# Patient Record
Sex: MALE | Race: WHITE
[De-identification: names, ages, dates, MRNs, and addresses within clinical notes are randomized per-mention and may not be internally consistent; named-entity substitution may affect disease eponyms.]

---

## 2019-02-03 ENCOUNTER — HOSPITAL ENCOUNTER (INPATIENT)
Dept: HOSPITAL 97 - ER | Age: 84
LOS: 1 days | DRG: 948 | End: 2019-02-04
Attending: FAMILY MEDICINE | Admitting: HOSPITALIST
Payer: COMMERCIAL

## 2019-02-03 VITALS — BODY MASS INDEX: 29.3 KG/M2

## 2019-02-03 DIAGNOSIS — C44.41: ICD-10-CM

## 2019-02-03 DIAGNOSIS — I25.10: ICD-10-CM

## 2019-02-03 DIAGNOSIS — F02.80: ICD-10-CM

## 2019-02-03 DIAGNOSIS — I48.91: ICD-10-CM

## 2019-02-03 DIAGNOSIS — E78.5: ICD-10-CM

## 2019-02-03 DIAGNOSIS — Z88.0: ICD-10-CM

## 2019-02-03 DIAGNOSIS — I44.39: ICD-10-CM

## 2019-02-03 DIAGNOSIS — I46.9: ICD-10-CM

## 2019-02-03 DIAGNOSIS — R41.82: Primary | ICD-10-CM

## 2019-02-03 DIAGNOSIS — F32.9: ICD-10-CM

## 2019-02-03 DIAGNOSIS — I10: ICD-10-CM

## 2019-02-03 DIAGNOSIS — Z95.5: ICD-10-CM

## 2019-02-03 DIAGNOSIS — I48.92: ICD-10-CM

## 2019-02-03 DIAGNOSIS — R47.81: ICD-10-CM

## 2019-02-03 DIAGNOSIS — Z66: ICD-10-CM

## 2019-02-03 DIAGNOSIS — G30.8: ICD-10-CM

## 2019-02-03 DIAGNOSIS — Z87.891: ICD-10-CM

## 2019-02-03 LAB
ALBUMIN SERPL BCP-MCNC: 3.3 G/DL (ref 3.4–5)
ALP SERPL-CCNC: 90 U/L (ref 45–117)
ALT SERPL W P-5'-P-CCNC: 20 U/L (ref 12–78)
AST SERPL W P-5'-P-CCNC: 15 U/L (ref 15–37)
BUN BLD-MCNC: 21 MG/DL (ref 7–18)
CKMB CREATINE KINASE MB: < 1 NG/ML (ref 0.3–3.6)
GLUCOSE SERPLBLD-MCNC: 135 MG/DL (ref 74–106)
HCT VFR BLD CALC: 35.8 % (ref 39.6–49)
INR BLD: 1.18
LIPASE SERPL-CCNC: 129 U/L (ref 73–393)
LYMPHOCYTES # SPEC AUTO: 1.1 K/UL (ref 0.7–4.9)
PMV BLD: 9.3 FL (ref 7.6–11.3)
POTASSIUM SERPL-SCNC: 4 MMOL/L (ref 3.5–5.1)
RBC # BLD: 4.1 M/UL (ref 4.33–5.43)
TROPONIN (EMERG DEPT USE ONLY): < 0.02 NG/ML (ref 0–0.04)
UA COMPLETE W REFLEX CULTURE PNL UR: (no result)

## 2019-02-03 PROCEDURE — 84145 PROCALCITONIN (PCT): CPT

## 2019-02-03 PROCEDURE — 83735 ASSAY OF MAGNESIUM: CPT

## 2019-02-03 PROCEDURE — 92610 EVALUATE SWALLOWING FUNCTION: CPT

## 2019-02-03 PROCEDURE — 84100 ASSAY OF PHOSPHORUS: CPT

## 2019-02-03 PROCEDURE — 83605 ASSAY OF LACTIC ACID: CPT

## 2019-02-03 PROCEDURE — 93306 TTE W/DOPPLER COMPLETE: CPT

## 2019-02-03 PROCEDURE — 93005 ELECTROCARDIOGRAM TRACING: CPT

## 2019-02-03 PROCEDURE — 82553 CREATINE MB FRACTION: CPT

## 2019-02-03 PROCEDURE — 71045 X-RAY EXAM CHEST 1 VIEW: CPT

## 2019-02-03 PROCEDURE — 80053 COMPREHEN METABOLIC PANEL: CPT

## 2019-02-03 PROCEDURE — 85025 COMPLETE CBC W/AUTO DIFF WBC: CPT

## 2019-02-03 PROCEDURE — 87040 BLOOD CULTURE FOR BACTERIA: CPT

## 2019-02-03 PROCEDURE — 85610 PROTHROMBIN TIME: CPT

## 2019-02-03 PROCEDURE — 87086 URINE CULTURE/COLONY COUNT: CPT

## 2019-02-03 PROCEDURE — 82550 ASSAY OF CK (CPK): CPT

## 2019-02-03 PROCEDURE — 80076 HEPATIC FUNCTION PANEL: CPT

## 2019-02-03 PROCEDURE — 83690 ASSAY OF LIPASE: CPT

## 2019-02-03 PROCEDURE — 85730 THROMBOPLASTIN TIME PARTIAL: CPT

## 2019-02-03 PROCEDURE — 81003 URINALYSIS AUTO W/O SCOPE: CPT

## 2019-02-03 PROCEDURE — 36415 COLL VENOUS BLD VENIPUNCTURE: CPT

## 2019-02-03 PROCEDURE — 82962 GLUCOSE BLOOD TEST: CPT

## 2019-02-03 PROCEDURE — 84484 ASSAY OF TROPONIN QUANT: CPT

## 2019-02-03 PROCEDURE — 80061 LIPID PANEL: CPT

## 2019-02-03 PROCEDURE — 70450 CT HEAD/BRAIN W/O DYE: CPT

## 2019-02-03 PROCEDURE — 87088 URINE BACTERIA CULTURE: CPT

## 2019-02-03 PROCEDURE — 51702 INSERT TEMP BLADDER CATH: CPT

## 2019-02-03 PROCEDURE — 99285 EMERGENCY DEPT VISIT HI MDM: CPT

## 2019-02-03 PROCEDURE — 80048 BASIC METABOLIC PNL TOTAL CA: CPT

## 2019-02-03 PROCEDURE — 81015 MICROSCOPIC EXAM OF URINE: CPT

## 2019-02-03 NOTE — EDPHYS
Physician Documentation                                                                           

 [facility]                                                                

Name: Jose F Steele                                                                             

Age: 88 yrs                                                                                       

Sex: Male                                                                                         

: 1930                                                                                   

MRN: [id number]                                                                                   

Arrival Date: 2019                                                                          

Time: 18:43                                                                                       

Account#: [id number]                                                                            

Bed 3                                                                                             

Private MD:                                                                                       

ED Physician Ronan Escobedo                                                                      

HPI:                                                                                              

                                                                                             

19:20 This 88 yrs old  Male presents to ER via EMS with complaints of Altered Mental jr8 

      Status.                                                                                     

19:20 The patient presents with confusion, decreased mental status, decreased responsiveness. jr8 

      Onset: The symptoms/episode began/occurred acutely, today, at 14:30. Possible causes:       

      unknown. Associated signs and symptoms: The patient has no apparent associated signs or     

      symptoms. Current symptoms: In the emergency department the patient's symptoms are          

      unchanged from the initial presentation. Patient's baseline: Neuro: alert but confused,     

      Motor: no deficits, Ambulation: walks without assistance, Speech: normal. The patient       

      has not experienced similar symptoms in the past. The patient has not recently seen a       

      physician.                                                                                  

                                                                                                  

Historical:                                                                                       

- Allergies:                                                                                      

19:12 PENICILLINS;                                                                            jl7 

- Home Meds:                                                                                      

19:12 sertraline 50 mg oral tab [Active]; lorazepam 1 mg Oral tab 1 tab PRN [Active];         jl7 

      memantine 28 mg Oral tab 1 cap once daily [Active]; aspirin 325 mg Oral tab 1 tab once      

      daily [Active]; donepezil 10 mg Oral tab 1 tab once daily [Active]; fosinopril 20 mg        

      Oral tab 1 tab once daily [Active]; simvastatin 20 mg Oral tab 1 tab once daily             

      [Active]; donepezil 10 mg oral tab 1 tab once daily [Active]; Triamcinolone Acetonide       

      Cabell [Active];                                                                              

- PMHx:                                                                                           

19:12 Alzheimers; Dementia; Hyperlipidemia; Hypertension;                                     jl7 

19:14 basal cell carcinoma; Depression;                                                       jl7 

- PSHx:                                                                                           

19:14 Heart stents;                                                                           jl7 

                                                                                                  

- Immunization history:: Adult Immunizations up to date.                                          

- Social history:: Smoking status: Patient/guardian denies using tobacco.                         

- Ebola Screening: : No symptoms or risks identified at this time.                                

                                                                                                  

                                                                                                  

ROS:                                                                                              

19:20 Unable to obtain ROS due to altered mental status.                                      jr8 

                                                                                                  

Exam:                                                                                             

19:18 Eyes:  Pupils equal round and reactive to light, extra-ocular motions intact.  Lids and jr8 

      lashes normal.  Conjunctiva and sclera are non-icteric and not injected.  Cornea within     

      normal limits.  Periorbital areas with no swelling, redness, or edema. ENT:  Nares          

      patent. No nasal discharge, no septal abnormalities noted.  Tympanic membranes are          

      normal and external auditory canals are clear.  Oropharynx with no redness, swelling,       

      or masses, exudates, or evidence of obstruction, uvula midline.  Mucous membranes           

      moist. Neck:  Trachea midline, no thyromegaly or masses palpated, and no cervical           

      lymphadenopathy.  Supple, full range of motion without nuchal rigidity, or vertebral        

      point tenderness.  No Meningismus. Cardiovascular:  Regular rate and rhythm with a          

      normal S1 and S2.  No gallops, murmurs, or rubs.  Normal PMI, no JVD.  No pulse             

      deficits. Respiratory:  Lungs have equal breath sounds bilaterally, clear to                

      auscultation and percussion.  No rales, rhonchi or wheezes noted.  No increased work of     

      breathing, no retractions or nasal flaring. Abdomen/GI:  Soft, non-tender, with normal      

      bowel sounds.  No distension or tympany.  No guarding or rebound.  No evidence of           

      tenderness throughout. Back:  No spinal tenderness.  No costovertebral tenderness.          

      Full range of motion. Skin:  Warm, dry with normal turgor.  Normal color with no            

      rashes, no lesions, and no evidence of cellulitis. MS/ Extremity:  Pulses equal, no         

      cyanosis.  Neurovascular intact.  Full, normal range of motion.                             

19:18 Neuro: Orientation: to person, Mentation: able to follow commands, slow to respond,         

      confused, Memory: immediate memory  is impaired, remote memory is impaired, recent          

      memory  is impaired, Cranial nerves: extraocular movements are intact, Speech is            

      dysarthric, slowed, slurred, Tongue strength is normal, Motor: moves all fours,             

      Sensation: no obvious gross deficits, seizure activity, is not displayed by the             

      patient, Abnormal movements: there are no abnormal movements.                               

                                                                                                  

Vital Signs:                                                                                      

18:43  / 69; Pulse 76; Resp 16 S; Temp 99.9(O); Pulse Ox 100% on R/A;                   jl7 

19:15  / 62; Pulse 77; Resp 18 S; Pulse Ox 100% on R/A;                                 jl7 

20:30  / 66; Pulse 76; Resp 16; Pulse Ox 100% ;                                         rr5 

21:12  / 76; Pulse 92; Resp 18; Pulse Ox 95% on R/A;                                    ea  

21:30  / 84; Pulse 80; Resp 18; Pulse Ox 96% on R/A;                                    ea  

                                                                                                  

NIH Stroke Scale Scores:                                                                          

18:43 NIHSS Score: 11                                                                         jl7 

19:18 NIHSS Score: 11                                                                         jr8 

                                                                                                  

MDM:                                                                                              

18:44 Patient medically screened.                                                             [facility] 

20:31 Data reviewed: vital signs, nurses notes, lab test result(s), EKG, radiologic studies,  [facility] 

      CT scan, plain films. Data interpreted: Pulse oximetry: on room air is 100 %.               

      Interpretation: normal. Counseling: I had a detailed discussion with the patient and/or     

      guardian regarding: the historical points, exam findings, and any diagnostic results        

      supporting the discharge/admit diagnosis, lab results, radiology results, the need for      

      further work-up and treatment in the hospital. Physician consultation: Pratima Ford MD     

      was called at 20:31, was contacted at 20:31, regarding admission, to the telemetry          

      unit. consult, patient's condition, and will see patient.                                   

                                                                                                  

                                                                                             

18:45 Order name: Urine Culture                                                               [facility] 

                                                                                             

18:45 Order name: Basic Metabolic Panel                                                       [facility] 

                                                                                             

18:45 Order name: Blood Culture Adult (2)                                                     [facility] 

                                                                                             

18:45 Order name: CBC with Diff                                                               [facility] 

                                                                                             

18:45 Order name: Ckmb                                                                        [facility] 

                                                                                             

18:45 Order name: CPK                                                                         [facility] 

                                                                                             

18:45 Order name: Lactate                                                                     [facility] 

                                                                                             

18:45 Order name: LFT's; Complete Time: 20:14                                                 [facility] 

                                                                                             

18:45 Order name: Lipase; Complete Time: 20:14                                                [facility] 

                                                                                             

18:45 Order name: Procalcitonin; Complete Time: 20:26                                         [facility] 

                                                                                             

18:45 Order name: Protime (+inr); Complete Time: 20:08                                        [facility] 

                                                                                             

18:45 Order name: Ptt, Activated; Complete Time: 20:08                                        [facility] 

                                                                                             

18:45 Order name: Troponin (emerg Dept Use Only); Complete Time: 20:14                        [facility] 

                                                                                             

18:45 Order name: Urine Microscopic Only; Complete Time: 20:26                                [facility] 

                                                                                             

18:46 Order name: Urine Culture                                                               [facility]

                                                                                             

18:46 Order name: Basic Metabolic Panel; Complete Time: 20:14                                 [facility]

                                                                                             

18:46 Order name: Blood Culture                                                               [facility]

                                                                                             

18:46 Order name: CBC with Automated Diff; Complete Time: 19:57                               [facility]

                                                                                             

18:46 Order name: CKMB Creatine Kinase MB; Complete Time: 20:14                               [facility]

                                                                                             

18:46 Order name: Creatine Phosphokinase; Complete Time: 20:14                                [facility]

                                                                                             

18:46 Order name: Lactate; Complete Time: 20:08                                               [facility]

                                                                                             

19:53 Order name: Urine Dipstick--Ancillary (enter results); Complete Time: 20:26             2 

                                                                                             

20:49 Order name: CBC with Automated Diff                                                     [facility]

                                                                                             

20:49 Order name: CBC with Automated Diff                                                     [facility]

                                                                                             

20:49 Order name: Comprehensive Metabolic Panel                                               [facility]

                                                                                             

20:49 Order name: Comprehensive Metabolic Panel                                               [facility]

                                                                                             

20:49 Order name: Lipid Profile                                                               [facility]

                                                                                             

20:49 Order name: Lipid Profile                                                               [facility]

                                                                                             

20:49 Order name: Magnesium                                                                   [facility]

                                                                                             

20:49 Order name: Magnesium                                                                   [facility]

                                                                                             

18:45 Order name: Cath; Complete Time: 19:50                                                  8 

                                                                                             

18:45 Order name: Chest Single View XRAY; Complete Time: 20:02                                8 

                                                                                             

18:45 Order name: Accucheck; Complete Time: 19:49                                             8 

                                                                                             

18:45 Order name: Cardiac monitoring; Complete Time: 19:30                                    8 

                                                                                             

18:45 Order name: EKG - Nurse/Tech; Complete Time: 19:49                                      8 

                                                                                             

18:45 Order name: IV Saline Lock - Large Bore; Complete Time: 19:50                           8 

                                                                                             

18:45 Order name: Labs collected and sent; Complete Time: 19:50                               8 

                                                                                             

18:45 Order name: O2 Per Protocol; Complete Time: 19:30                                       8 

                                                                                             

18:45 Order name: O2 Sat Monitoring; Complete Time: 19:30                                     8 

                                                                                             

18:45 Order name: Urine Dipstick-Ancillary (obtain specimen); Complete Time: 19:49            8 

                                                                                             

18:45 Order name: CT Head Brain wo Cont; Complete Time: 19:21                                 8 

                                                                                             

20:47 Order name: NPO                                                                         [facility]

                                                                                             

20:47 Order name: NPO                                                                         [facility]

                                                                                             

20:48 Order name: Physical Therapy Consult                                                    [facility]

                                                                                             

20:48 Order name: Speech Therapy Consult                                                      [facility]

                                                                                             

20:48 Order name: NPO                                                                         [facility]

                                                                                             

20:48 Order name: Echo with Doppler                                                           [facility]

                                                                                             

20:49 Order name: EKG Electrocardiogram                                                       [facility]

                                                                                             

20:49 Order name: Phosphorus                                                                  [facility]

                                                                                             

20:49 Order name: Phosphorus                                                                  [facility]

                                                                                             

20:49 Order name: Stroke Protocol                                                             [facility]

                                                                                                  

Administered Medications:                                                                         

No medications were administered                                                                  

                                                                                                  

                                                                                                  

Point of Care Testing:                                                                            

      Blood Glucose:                                                                              

19:30 Blood Glucose: 154 mg/dL;                                                               rr5 

      Ranges:                                                                                     

      Critical Glucose Levels:Adult <50 mg/dl or >400 mg/dl  <40 mg/dl or >180 mg/dl       

Disposition:                                                                                      

                                                                                             

09:21 Co-signature as Attending Physician, Ronan Escobedo MD I agree with the assessment and  rich 

      plan of care.                                                                               

                                                                                                  

Disposition:                                                                                      

19 20:31 Hospitalization ordered by Pratima Ford for Inpatient Admission. Preliminary     

  diagnosis is Altered mental status, unspecified.                                                

- Bed requested for Telemetry/MedSurg (Inpatient).                                                

- Status is Inpatient Admission.                                                              ea  

- Condition is Fair.                                                                              

- Problem is new.                                                                                 

- Symptoms are unchanged.                                                                         

UTI on Admission? No                                                                              

                                                                                                  

                                                                                                  

                                                                                                  

                                                                                                  

NIH Stroke Scale - NIH Stroke Score                                                               

Date: 2019                                                                                  

Time: 18:43                                                                                       

Total Score = 11                                                                                  

  1a. Level of Consciousness (LOC) - 0(Alert)                                                     

  1b. Level of Consciousness (LOC) (Year \T\ Age) - 2(Neither)                                    

  1c. LOC Commands (Open \T\ Closes Eyes/) - 0(Both)                                          

   2. Best Gaze (Lateral Gaze Paresis) - 0(Normal)                                                

   3. Visual Field Loss - 0(No visual loss)                                                       

   4. Facial Palsy - 0(Normal)                                                                    

  5a. Left Arm: Motor (10-second hold) - 1(Drift)                                                 

  5b. Right Arm: Motor (10-second hold) - 1(Drift)                                                

  6a. Left Leg: Motor (5-second hold - always test supine) - 1(Drift)                             

  6b. Right Leg: Motor (5-second hold - always test supine) - 1(Drift)                            

   7. Limb Ataxia (finger/nose \T\ heel/shin - test with eyes open) - 2(Present in two            

      limbs)                                                                                      

   8. Sensory Loss (pinprick arms/legs/face) - 0(Normal)                                          

   9. Best Language: Aphasia (description/naming/reading) - 2(Severe aphasia)                     

  10. Dysarthria (speech clarity - read or repeat words) - 1(Mild to Moderate)                    

  11. Extinction and Inattention (visual/tactile/auditory/spatial/personal) - 0(No                

      abnormality)                                                                                

Initials: jl7                                                                                     

                                                                                                  

                                                                                                  

NIH Stroke Scale - NIH Stroke Score                                                               

Date: 2019                                                                                  

Time: 19:18                                                                                       

Total Score = 11                                                                                  

  1a. Level of Consciousness (LOC) - 0(Alert)                                                     

  1b. Level of Consciousness (LOC) (Year \T\ Age) - 2(Neither)                                    

  1c. LOC Commands (Open \T\ Closes Eyes/) - 0(Both)                                          

   2. Best Gaze (Lateral Gaze Paresis) - 0(Normal)                                                

   3. Visual Field Loss - 0(No visual loss)                                                       

   4. Facial Palsy - 0(Normal)                                                                    

  5a. Left Arm: Motor (10-second hold) - 1(Drift)                                                 

  5b. Right Arm: Motor (10-second hold) - 1(Drift)                                                

  6a. Left Leg: Motor (5-second hold - always test supine) - 1(Drift)                             

  6b. Right Leg: Motor (5-second hold - always test supine) - 1(Drift)                            

   7. Limb Ataxia (finger/nose \T\ heel/shin - test with eyes open) - 2(Present in two            

      limbs)                                                                                      

   8. Sensory Loss (pinprick arms/legs/face) - 0(Normal)                                          

   9. Best Language: Aphasia (description/naming/reading) - 2(Severe aphasia)                     

  10. Dysarthria (speech clarity - read or repeat words) - 1(Mild to Moderate)                    

  11. Extinction and Inattention (visual/tactile/auditory/spatial/personal) - 0(No                

      abnormality)                                                                                

Initials: jr8                                                                                     

                                                                                                  

Signatures:                                                                                       

Dispatcher [employer]                           EDMS                                                 

Marcella Gutierrez RN RN mw Anderson, Corey, MD MD cha Roszak, Josh, PA PA jr8                                                  

Alvino Espinal RN RN   jl7                                                  

Sasha Ramirez RN RN ea                                                   

                                                                                                  

Corrections: (The following items were deleted from the chart)                                    

                                                                                             

20:52 20:31 Hospitalization Ordered by Pratima Ford MD for Inpatient Admission.      jeremias          

      Preliminary diagnosis is Altered mental status, unspecified. Bed requested for              

      Telemetry/MedSurg (Inpatient). Status is Inpatient Admission. Condition is                  

      Fair. Problem is new. Symptoms are unchanged. UTI on Admission? No. jr8                     

20:53 20:49 Chest Pa And Lat (2 Views) ordered. [facility]                                  EDMS        

21:45 20:52 2019 20:31 Hospitalization Ordered by Pratima Ford MD for          ea          

      Inpatient Admission. Preliminary diagnosis is Altered mental status,                        

      unspecified. Bed requested for Telemetry/MedSurg (Inpatient). Status is                     

      Inpatient Admission. Condition is Fair. Problem is new. Symptoms are unchanged.             

      UTI on Admission? No. mw                                                                    

                                                                                                  

**************************************************************************************************

## 2019-02-03 NOTE — RAD REPORT
EXAM DESCRIPTION:  RAD - Chest Single View - 2/3/2019 7:08 pm

 

CLINICAL HISTORY:  FEVER

Chest pain.

 

COMPARISON:  Chest Single View dated 9/23/2018

 

FINDINGS:  Portable technique limits examination quality.

 

Linear opacities are present left retrocardiac region which may represent atelectasis or early pneumo
timothy. The lungs are otherwise clear. The heart is normal in size. No displaced fractures.

## 2019-02-03 NOTE — RAD REPORT
EXAM DESCRIPTION:  CT - Head Brain Wo Cont - 2/3/2019 7:00 pm

 

CLINICAL HISTORY:  Confused;Declining state

Headache, drowsiness

 

COMPARISON:  Head C Spine Mpr Wo Con dated 9/23/2018

 

TECHNIQUE:  All CT scans are performed using dose optimization technique as appropriate and may inclu
de automated exposure control or mA/KV adjustment according to patient size.

 

FINDINGS:  No intracranial hemorrhage, hydrocephalus or extra-axial fluid collection.Mild generalized
 brain atrophy is present with mild periventricular and deep white matter chronic microvascular ische
tao changes.No areas of brain edema or evidence of midline shift.

 

Fluid is present in the left maxillary antrum, ethmoid air cells and left frontal sinus compatible wi
th multifocal sinusitis. The calvarium is intact.

 

IMPRESSION:  No acute intracranial abnormality.

 

Multifocal sinusitis.

## 2019-02-03 NOTE — ER
Nurse's Notes                                                                                     

 [facility]                                                                

Name: Jose F Steele                                                                             

Age: 88 yrs                                                                                       

Sex: Male                                                                                         

: 1930                                                                                   

MRN: [id number]                                                                                   

Arrival Date: 2019                                                                          

Time: 18:43                                                                                       

Account#: [id number]                                                                            

Bed 3                                                                                             

Private MD:                                                                                       

Diagnosis: Altered mental status, unspecified                                                     

                                                                                                  

Presentation:                                                                                     

                                                                                             

18:44 Presenting complaint: EMS states: Family reports he is altered, last known normal 1430, jl7 

      difficulty speaking. Transition of care: patient was not received from another setting      

      of care. Onset of symptoms was 2019 at 14:30. Risk Assessment: Do you want     

      to hurt yourself or someone else? Patient reports no desire to harm self or others.         

      Initial Sepsis Screen: Does the patient meet any 2 criteria? Altered Mental Status. No.     

      Patient's initial sepsis screen is negative. Does the patient have a suspected source       

      of infection? Yes: Productive cough/pneumonia. Care prior to arrival: None. Glucose         

      check: 146.                                                                                 

18:44 Method Of Arrival: EMS: [facility]                                                7 

18:44 Acuity: COREY 2                                                                           jl7 

19:00 An acute neurological deficit is present. Pre-hospital glucose is not applicable to     ea  

      this patient.                                                                               

                                                                                                  

Triage Assessment:                                                                                

18:43 General: Appears in no apparent distress. uncomfortable, Behavior is calm, cooperative. jl7 

      Pain: Unable to use pain scale. Patient is disoriented. Does not appear to understand       

      pain scale. EENT: No signs and/or symptoms were reported regarding the EENT system.         

      Neuro: Level of Consciousness is awake, alert, obeys commands, Oriented to person.          

      Cardiovascular: Patient's skin is warm and dry. Respiratory: Airway is patent               

      Respiratory effort is even, unlabored, Respiratory pattern is regular, symmetrical.         

      Derm: Skin is pink, warm \T\ dry.                                                           

19:10 The onset of the patients symptoms was 2019 at 14:30.                      ea  

                                                                                                  

Stroke Activation: Symptom onset < 3 hours                                                        

 Physician: Stroke Attending; Name: ; Notified At: ; Arrived At:                                  

 Physician: Chief Stroke Resident; Name: ; Notified At: ; Arrived At:                             

 Physician: Stroke Resident; Name: ; Notified At: ; Arrived At:                                   

 Physician: ED Attending; Name: ; Notified At: ; Arrived At:                                      

 Physician: ED Resident; Name: ; Notified At: ; Arrived At:                                       

                                                                                                  

Historical:                                                                                       

- Allergies:                                                                                      

19:12 PENICILLINS;                                                                            jl7 

- Home Meds:                                                                                      

19:12 sertraline 50 mg oral tab [Active]; lorazepam 1 mg Oral tab 1 tab PRN [Active];         jl7 

      memantine 28 mg Oral tab 1 cap once daily [Active]; aspirin 325 mg Oral tab 1 tab once      

      daily [Active]; donepezil 10 mg Oral tab 1 tab once daily [Active]; fosinopril 20 mg        

      Oral tab 1 tab once daily [Active]; simvastatin 20 mg Oral tab 1 tab once daily             

      [Active]; donepezil 10 mg oral tab 1 tab once daily [Active]; Triamcinolone Acetonide       

      McMullen [Active];                                                                              

- PMHx:                                                                                           

19:12 Alzheimers; Dementia; Hyperlipidemia; Hypertension;                                     jl7 

19:14 basal cell carcinoma; Depression;                                                       jl7 

- PSHx:                                                                                           

19:14 Heart stents;                                                                           jl7 

                                                                                                  

- Immunization history:: Adult Immunizations up to date.                                          

- Social history:: Smoking status: Patient/guardian denies using tobacco.                         

- Ebola Screening: : No symptoms or risks identified at this time.                                

                                                                                                  

                                                                                                  

Screenin:15 Abuse screen: Denies threats or abuse. Denies injuries from another. Nutritional        jl7 

      screening: No deficits noted. Tuberculosis screening: No symptoms or risk factors           

      identified. Fall Risk Secondary diagnosis (15 points) Alzheimer's, dementia, IV access      

      (20 points). Gait- Weak (10 pts.). Total Garcia Fall Scale indicates High Risk Score (45     

      or more points). Fall prevention measures have been instituted. Side Rails Up X 2           

      Placed Close to Nursing Station Frequent Obs/Assessments Occuring Family Present and        

      informed to notify staff if the need to leave the bedside As available patient and          

      family educated on Fall Prevention Program and Strategies.                                  

                                                                                                  

Assessment:                                                                                       

18:45 The patient has not been NPO before screening. The patient is currently on the          jl7 

      following diet: Regular The patient is alert, and able to follow commands. The patient      

      exhibits slurred or garbled speech. The patient is exhibiting difficulty speaking. The      

      patient is exhibiting difficulty understanding words. The patient is able to swallow        

      own secretions with no drooling or need for suction. Pt cannot follow commands Pt           

      cannot follow commands The patient failed the bedside swallow screening. The patient        

      will be kept NPO until cleared by Speech Therapy or Physician. Provider notified of         

      bedside swallow screening results: Derrick SABA.                                          

18:45 General: See triage assessment.                                                         [facility] 

18:45 Reassessment: Pt to CT via stretcher with ELOY De Oliveira.                                   [facility] 

19:10 General: Appears in no apparent distress. Behavior is calm. Pain: Unable to use pain    ea  

      scale. FLACC scale score is 0 out of 10. Neuro: Level of Consciousness is awake, alert,     

      Oriented to none Speech aphasia. Cardiovascular: Heart tones S1 S2 present Patient's        

      skin is warm and dry. Respiratory: Airway is patent Respiratory effort is even,             

      unlabored, Respiratory pattern is regular, symmetrical, Breath sounds with crackles in      

      right posterior upper lobe. GI: Abdomen is non-distended. Derm: Skin is pink, warm \T\      

      dry. staples noted to occipital area of head.                                               

20:30 Reassessment: Pt resting with eyes closed, respirations even and unlabored, chest       ea  

      expansions even and symmetrical. Pt awakes with verbal stimuli. Pt not oriented. Son        

      remains at bedside.                                                                         

20:55 T-PA (Activase) Screening: Contraindications: Other: not applicable at this time.       ea  

21:27 Reassessment: Report called to Kerry LYONS on fourth floor. Reassessment: Patient and/or   ea  

      family updated on plan of care and expected duration. Pain level reassessed. Pt resting     

      with eyes closed, respirations even and unlabored, chest expansions even and                

      symmetrical.                                                                                

21:44 Reassessment: Patient and/or family updated on plan of care and expected duration. Pain ea  

      level reassessed. Pt awaken, not oriented, respirations even and unlabored, chest           

      expansions even and symmetrical. No s/s of pain or discomfort noted at this time. Pt        

      admitted to fourth floor via stretcher per tech, accompanied by family, pt tolerating       

      well.                                                                                       

                                                                                                  

Vital Signs:                                                                                      

18:43  / 69; Pulse 76; Resp 16 S; Temp 99.9(O); Pulse Ox 100% on R/A;                   jl7 

19:15  / 62; Pulse 77; Resp 18 S; Pulse Ox 100% on R/A;                                 jl7 

20:30  / 66; Pulse 76; Resp 16; Pulse Ox 100% ;                                         rr5 

21:12  / 76; Pulse 92; Resp 18; Pulse Ox 95% on R/A;                                    ea  

21:30  / 84; Pulse 80; Resp 18; Pulse Ox 96% on R/A;                                    ea  

                                                                                                  

NIH Stroke Scale Scores:                                                                          

18:43 NIHSS Score: 11                                                                         jl7 

19:18 NIHSS Score: 11                                                                         jr8 

                                                                                                  

ED Course:                                                                                        

18:43 Patient arrived in ED.                                                                  jl7 

18:43 Arm band placed on right wrist.                                                         jl7 

18:43 Patient has correct armband on for positive identification. Placed in gown. Bed in low  jl7 

      position. Call light in reach. Side rails up X2. Cardiac monitor on. Pulse ox on. NIBP      

      on.                                                                                         

18:44 Derrick Hartley PA is PHCP.                                                               jr8 

18:44 Ronan Escobedo MD is Attending Physician.                                             jr8 

19:01 CT Head Brain wo Cont In Process Unspecified.                                           EDMS

19:03 Triage completed.                                                                       jl7 

19:06 X-ray completed. Portable x-ray completed in exam room. Patient tolerated procedure     la2 

      well.                                                                                       

19:09 Chest Single View XRAY In Process Unspecified.                                          EDMS

19:25 Inserted saline lock: 22 gauge in left antecubital area, using aseptic technique. Blood ea  

      collected.                                                                                  

19:44 Nicolas Vicente, RN is Primary Nurse.                                                    rr5 

19:47 Straight cath inserted, using sterile technique, 18 Fr. Specimen obtained.              jb5 

19:48 Sasha Ramirez, ELOY is Primary Nurse.                                                    ea  

19:49 Urine Culture Sent.                                                                     jb5 

19:49 Urine Culture Sent.                                                                     jb5 

20:31 Pratima Ford MD is Hospitalizing Provider.                                           jr8 

20:47 No provider procedures requiring assistance completed. Patient admitted, IV remains in  ea  

      place.                                                                                      

                                                                                                  

Administered Medications:                                                                         

No medications were administered                                                                  

                                                                                                  

                                                                                                  

Point of Care Testing:                                                                            

      Blood Glucose:                                                                              

19:30 Blood Glucose: 154 mg/dL;                                                               rr5 

      Ranges:                                                                                     

                                                                                                  

Outcome:                                                                                          

20:31 Decision to Hospitalize by Provider.                                                    jr8 

20:47 Instructed on Pt's son instructed on need for admit, son verbalized the understanding   ea  

      of instruction                                                                              

21:26 Condition: stable                                                                       ea  

21:43 Admitted to Med/surg accompanied by tech, family with patient, via stretcher, room 402, ea  

      with chart, Report called to  Kerry LYONS                                                      

21:45 Patient left the ED.                                                                    ea  

                                                                                                  

                                                                                                  

NIH Stroke Scale - NIH Stroke Score                                                               

Date: 2019                                                                                  

Time: 18:43                                                                                       

Total Score = 11                                                                                  

  1a. Level of Consciousness (LOC) - 0(Alert)                                                     

  1b. Level of Consciousness (LOC) (Year \T\ Age) - 2(Neither)                                    

  1c. LOC Commands (Open \T\ Closes Eyes/) - 0(Both)                                          

   2. Best Gaze (Lateral Gaze Paresis) - 0(Normal)                                                

   3. Visual Field Loss - 0(No visual loss)                                                       

   4. Facial Palsy - 0(Normal)                                                                    

  5a. Left Arm: Motor (10-second hold) - 1(Drift)                                                 

  5b. Right Arm: Motor (10-second hold) - 1(Drift)                                                

  6a. Left Leg: Motor (5-second hold - always test supine) - 1(Drift)                             

  6b. Right Leg: Motor (5-second hold - always test supine) - 1(Drift)                            

   7. Limb Ataxia (finger/nose \T\ heel/shin - test with eyes open) - 2(Present in two            

      limbs)                                                                                      

   8. Sensory Loss (pinprick arms/legs/face) - 0(Normal)                                          

   9. Best Language: Aphasia (description/naming/reading) - 2(Severe aphasia)                     

  10. Dysarthria (speech clarity - read or repeat words) - 1(Mild to Moderate)                    

  11. Extinction and Inattention (visual/tactile/auditory/spatial/personal) - 0(No                

      abnormality)                                                                                

Initials: jl7                                                                                     

                                                                                                  

                                                                                                  

NIH Stroke Scale - NIH Stroke Score                                                               

Date: 2019                                                                                  

Time: 19:18                                                                                       

Total Score = 11                                                                                  

  1a. Level of Consciousness (LOC) - 0(Alert)                                                     

  1b. Level of Consciousness (LOC) (Year \T\ Age) - 2(Neither)                                    

  1c. LOC Commands (Open \T\ Closes Eyes/) - 0(Both)                                          

   2. Best Gaze (Lateral Gaze Paresis) - 0(Normal)                                                

   3. Visual Field Loss - 0(No visual loss)                                                       

   4. Facial Palsy - 0(Normal)                                                                    

  5a. Left Arm: Motor (10-second hold) - 1(Drift)                                                 

  5b. Right Arm: Motor (10-second hold) - 1(Drift)                                                

  6a. Left Leg: Motor (5-second hold - always test supine) - 1(Drift)                             

  6b. Right Leg: Motor (5-second hold - always test supine) - 1(Drift)                            

   7. Limb Ataxia (finger/nose \T\ heel/shin - test with eyes open) - 2(Present in two            

      limbs)                                                                                      

   8. Sensory Loss (pinprick arms/legs/face) - 0(Normal)                                          

   9. Best Language: Aphasia (description/naming/reading) - 2(Severe aphasia)                     

  10. Dysarthria (speech clarity - read or repeat words) - 1(Mild to Moderate)                    

  11. Extinction and Inattention (visual/tactile/auditory/spatial/personal) - 0(No                

      abnormality)                                                                                

Initials: jr8                                                                                     

                                                                                                  

Signatures:                                                                                       

Dispatcher [employer]                           EDDerrick Chi PA PA   jr8                                                  

Ella Flores                          jb5                                                  

Alvino Espinal RN                        RN   jl7                                                  

Sasha Ramirez RN                      RN   ea                                                   

Jacquelyn Woodruff2                                                  

Nicolas Vicente RN                      RN   rr5                                                  

                                                                                                  

Corrections: (The following items were deleted from the chart)                                    

19:07 18:44 Care prior to arrival: None. jl7                                          jl7         

20:55 19:10 General: Appears in no apparent distress. Behavior is calm, cooperative,  ea          

      appropriate for age, ea                                                                     

20:55 19:10 Neuro: Level of Consciousness is awake, alert, obeys commands, Oriented   ea          

      to person, place, time, situation, ea                                                       

                                                                                                  

**************************************************************************************************

## 2019-02-04 VITALS — TEMPERATURE: 99.1 F | DIASTOLIC BLOOD PRESSURE: 72 MMHG | SYSTOLIC BLOOD PRESSURE: 155 MMHG

## 2019-02-04 VITALS — OXYGEN SATURATION: 96 %

## 2019-02-04 LAB
ALBUMIN SERPL BCP-MCNC: 2.9 G/DL (ref 3.4–5)
ALP SERPL-CCNC: 79 U/L (ref 45–117)
ALT SERPL W P-5'-P-CCNC: 16 U/L (ref 12–78)
AST SERPL W P-5'-P-CCNC: 11 U/L (ref 15–37)
BUN BLD-MCNC: 17 MG/DL (ref 7–18)
GLUCOSE SERPLBLD-MCNC: 106 MG/DL (ref 74–106)
HCT VFR BLD CALC: 32.9 % (ref 39.6–49)
HDLC SERPL-MCNC: 52 MG/DL (ref 40–60)
LYMPHOCYTES # SPEC AUTO: 1.3 K/UL (ref 0.7–4.9)
MAGNESIUM SERPL-MCNC: 2.2 MG/DL (ref 1.8–2.4)
PMV BLD: 9.1 FL (ref 7.6–11.3)
POTASSIUM SERPL-SCNC: 3.7 MMOL/L (ref 3.5–5.1)
RBC # BLD: 3.86 M/UL (ref 4.33–5.43)

## 2019-02-04 NOTE — P.HP
Certification for Inpatient


Patient admitted to: Inpatient


With expected LOS: >2 Midnights


Patient will require the following post-hospital care: Home Health Services


Practitioner: I am a practitioner with admitting privileges, knowledge of 

patient current condition, hospital course, and medical plan of care.


Services: Services provided to patient in accordance with Admission 

requirements found in Title 42 Section 412.3 of the Code of Federal Regulations





Patient History


Date of Service: 02/03/19


Reason for admission:  altered mental status


History of Present Illness: 





  Patient is an 88-year-old gentleman who came to the hospital altered mental 

status.  Patient had excision of a scalp lesion about 10 days ago.  This was 

diagnosed as basal cell CA.  Patient has not been as active since the 

procedure.  Prior to the procedure he would ambulate on his own without any 

assistance and he did most of his ADLs all by himself.  He is a little 

forgetful but for the most part he is very active.  He does have swelling of 

his lower extremities from time to time but no other major issues over the last 

year.  A little bit around the afternoon he started having slurred speech.  

Patient's son was concerned and brought him into the emergency room.  Patient 

had a CT of the brain which was negative.  His electrolytes did not reveal any 

significant abnormality.  He was in atrial fibrillation but his rate is 

controlled.  The concern is that he may have had a stroke.  Will get MRI of the 

brain for further evaluation.


Allergies





Penicillins Allergy (Verified 02/03/19 22:08)


 Hives





Home Medications: 








Aspirin 325 mg PO DAILY 02/03/19 


Donepezil [Aricept] 10 mg PO BEDTIME 02/03/19 


Fosinopril Sodium 20 mg PO DAILY 02/03/19 


Memantine HCl [Namenda Xr] 28 mg PO DAILY 02/03/19 


Mupirocin 1 appl TOP DAILY 02/03/19 


Propylene Glycol/Peg 400/Pf [Lubricant Eye 0.4%-0.3% Drops] 1 each OP BID 02/03/ 19 


Sertraline [Zoloft] 25 mg PO DAILY 02/03/19 


Simvastatin 20 mg PO BEDTIME 02/03/19 








- Past Medical/Surgical History


Has patient received pneumonia vaccine in the past: Yes


Diabetic: No


-: alzhiemers


-: dementia


-: hyperlipidemia


-: HTN


-: basal cell carcinoma


-: depression


-: heart stents





- Family History


  ** Father


Family History: Reviewed- Non-Contributory





- Social History


Smoking Status: Former smoker


Alcohol use: No


CD- Drugs: No


Caffeine use: No


Place of Residence: Nursing Home





Review of Systems


10-point ROS is otherwise unremarkable





Physical Examination





- Vital Signs


Temperature: 99.1 F


Blood Pressure: 155/72


Pulse: 67


Respirations: 18


Pulse Ox (%): 96





- Physical Exam


General: Alert, In no apparent distress, Oriented x1


HEENT: Atraumatic, PERRLA, Mucous membr. moist/pink, EOMI, Sclerae nonicteric


Neck: Supple, 2+ carotid pulse no bruit, No LAD, Without JVD or thyroid 

abnormality


Respiratory: Clear to auscultation bilaterally, Normal air movement


Cardiovascular: Normal S1 S2, Irregular heart rate/rhythm, Systolic murmur


Gastrointestinal: Normal bowel sounds, Soft and benign, Non-distended, No 

tenderness


Musculoskeletal: No tenderness


Integumentary: No rashes


Neurological: Normal speech, Normal tone, Sensation intact, Cranial nerves 3-12 

intact, Abnormal gait, Abnormal strength, Abnormal affect


Lymphatics: No axilla or inguinal lymphadenopathy





- Studies


Laboratory Data (last 24 hrs)





02/03/19 19:25: PT 14.0 H, INR 1.18, APTT 31.7


02/03/19 19:25: WBC 8.9  D, Hgb 11.8 L, Hct 35.8 L D, Plt Count 174


02/03/19 19:25: Sodium 139, Potassium 4.0, BUN 21 H, Creatinine 1.20, Glucose 

135 H, Total Bilirubin 0.7, AST 15, ALT 20, Alkaline Phosphatase 90, Lipase 129








Assessment & Plan





- Problems (Diagnosis)


(1) Altered mental status


Current Visit: Yes   Status: Acute   





(2) Slurred speech


Current Visit: Yes   Status: Acute   





(3) Atrial fibrillation


Current Visit: Yes   Status: Acute   





(4) Scalp lesion


Current Visit: Yes   Status: Acute   





(5) History of coronary artery disease


Current Visit: Yes   Status: Acute   





(6) History of coronary artery stent placement


Current Visit: Yes   Status: Acute   





- Plan





1. MRI of the brain


2. Echocardiogram and carotid Doppler


3. Anti-platelet therapy and statin therapy


4. Neurology consultation


5. Physical therapy/occupational therapy/speech therapy evaluation


6. Modified barium swallow study


7. DVT prophylaxis if MRI is negative for microhemorrhage in an ischemic stroke

; hemorrhagic stroke not seen on CT scan





Discharge Plan: Home


Plan to discharge in: Greater than 2 days





- Advance Directives


Does patient have a Living Will: Yes


Does patient have a Durable POA for Healthcare: Yes





- Code Status/Comfort Care


Code Status Assessed: Yes


Code Status: Full Code


Critical Care: No


Time Spent Managing PTS Care (In Minutes): 50

## 2019-02-04 NOTE — ECHO
HEIGHT: 5 ft 9 in   WEIGHT: 198 lb 11.2 oz   DATE OF STUDY: 02/04/2019   REFER DR: 

2-DIMENSIONAL: YES

     M.MODE: YES

 DOPPLER: YES

COLOR FLOW: YES



                    TDS:  NO

PORTABLE: NO

 DEFINITY:  NO

BUBBLE STUDY: NO





DIAGNOSIS:  STROKE



CARDIAC HISTORY:  

CATHERIZATION: NO

SURGERY: NO

PROSTHETIC VALVE: NO

PACEMAKER: NO





MEASUREMENTS (cm)

    DIASTOLIC (NORMALS)                 SYSTOLIC (NORMALS)

IVSd                 1.1 (0.6-1.2)                    LA Diam 4.5 (1.9-4.0)     LVEF       
  62%  

LVIDd               4.2 (3.5-5.7)                        LVIDs      2.8 (2.0-3.5)     %FS  
        33%

LVPWd             1.1 (0.6-1.2)

Ao Diam           3.6 (2.0-3.7)



2 DIMENSIONAL ASSESSMENT:

RIGHT ATRIUM:                   NORMAL

LEFT ATRIUM:       DILATED



RIGHT VENTRICLE:            NORMAL

LEFT VENTRICLE: NORMAL



TRICUSPID VALVE:             NORMAL

MITRAL VALVE:     NORMAL



PULMONIC VALVE:             NORMAL

AORTIC VALVE:     NORMAL



PERICARDIAL EFFUSION: NONE

AORTIC ROOT:      NORMAL





LEFT VENTRICULAR WALL MOTION:     NORMAL.



DOPPLER/COLOR FLOW:     MILD TRICUSPID REGURGITATION. ESTIMATED RIGHT VENTRICULAR SYSTOLIC 
PRESSURE 58 MMHG. (MODERATE PULMONARY HYPERTENSION).     



COMMENTS:      NORMAL LEFT VENTRICULAR EJECTION FRACTION. DILATED LEFT ATRIUM. MILD 
TRICUSPID REGURGITATION. MODERATE PULMONARY HYPERTENSION. 



TECHNOLOGIST:   MUMTAZ CARUSO RDCS

## 2019-02-04 NOTE — EKG
Test Date:    2019-02-03               Test Time:    19:20:21

Technician:   MARILYNN                                     

                                                     

MEASUREMENT RESULTS:                                       

Intervals:                                           

Rate:         90                                     

WA:                                                  

QRSD:         72                                     

QT:           428                                    

QTc:          523                                    

Axis:                                                

P:                                                   

WA:                                                  

QRS:          5                                      

T:            50                                     

                                                     

INTERPRETIVE STATEMENTS:                                       

                                                     

atrial flutter with variable AV block

Low voltage QRS

Septal infarct, age undetermined

Abnormal ECG

Compared to ECG 09/23/2018 09:22:31

no significant change from previous ECG

Electronically Signed On 02-04-19 07:16:22 CST by Lupillo Aponte

## 2019-02-04 NOTE — CON
History Of Present Illness:  Mr. Steele is 88 years old.  He came into the hospital because his ment
al status seemed to be different.  We do not have any old records of what his mental status is like w
ith any details in our old charts.  I believe this is his first hospital admission here.  Mr. Steele
 has dementia that is severe; it is attributed to Alzheimer's disease.  We do not know if he has a hi
story of atrial fibrillation or not, the patient is unable to say.  His conversation consists of "hel
lo" and answering a question yes or no occasionally, most questions he just stares, and does not seem
 to really comprehend, but he was able to answer "no" to does he smoke, and he does not remember what
 was wrong when he came to the hospital.  About a week ago, he had a basal cell carcinoma removed.  ESEQUIEL encarnacion has a bandage on his face.



Outpatient Medications:  Memantine, Aricept, sertraline, Lubricant Eye Drops, simvastatin, fosinopril
, aspirin, and mupirocin ointment applied to his surgical site.



Allergies:  HE IS ALLERGIC TO PENICILLIN.



Physical Examination:

General:  He appears to be his stated age.  He is awake, but not oriented to person, place, or time. 
 Does not seem to be aphasic or deaf, but severely demented. 

Neck:  There is no carotid bruit. 

Lungs:  Clear. 

Heart:  Irregular. 

Abdomen:  Soft. 

Extremities:  Changes of chronic venous stasis without edema.  No cyanosis, clubbing.  Distal pulses 
diminished, but palpable.



Diagnostic Data:  His electrocardiogram shows atrial flutter, variable AV block.  Average ventricular
 response rate when he is at rest, it is in the 70s.  There is evidence of possible septal infarct.  
We have no old EKGs for comparison.  Mr. De La Cruz atrial flutter should ideally be treated with an an
ticoagulant given his dementia, I have reservations about doing that.  I think it is probably better 
to not give him anticoagulant unless there is a clear desire, the family to provide 24/7 care to him 
to make sure he is not at risk of falling.





SH/MODL

DD:  02/04/2019 07:12:41Voice ID:  [id number]

DT:  02/04/2019 09:51:31Report ID:  [id number]

## 2019-02-04 NOTE — P.DS
Admission Date: 02/03/19


Discharge Date: 02/04/19


Reason for Admission:  altered mental status


Consultations: 





Cardiology





- Problems


(1) Altered mental status


Onset Date: 02/04/19   Current Visit: Yes   Status: Acute   





(2) Atrial fibrillation


Onset Date: 02/04/19   Current Visit: Yes   Status: Acute   





(3) History of coronary artery disease


Onset Date: 02/04/19   Current Visit: Yes   Status: Acute   





(4) History of coronary artery stent placement


Onset Date: 02/04/19   Current Visit: Yes   Status: Acute   





(5) Scalp lesion


Onset Date: 02/04/19   Current Visit: Yes   Status: Acute   


Brief History of Present Illness: 





Patient is an 88-year-old gentleman who came to the hospital altered mental 

status.  Patient had excision of a scalp lesion about 10 days ago.  This was 

diagnosed as basal cell CA.  Patient has not been as active since the 

procedure.  Prior to the procedure he would ambulate on his own without any 

assistance and he did most of his ADLs all by himself.  He is a little 

forgetful but for the most part he is very active.  He does have swelling of 

his lower extremities from time to time but no other major issues over the last 

year.  A little bit around the afternoon he started having slurred speech.  

Patient's son was concerned and brought him into the emergency room.  Patient 

had a CT of the brain which was negative.  His electrolytes did not reveal any 

significant abnormality.  He was in atrial fibrillation but his rate is 

controlled.  The concern is that he may have had a stroke.  Will get MRI of the 

brain for further evaluation.


Hospital Course: 


This is a 88-year-old male with significant past medical history of end-stage 

Alzheimer's disease with severe dementia, CAD cardiac stents hypertension 

hyperlipidemia who was admitted overnight for altered mental status and slur 

speech.  Patient has been declining since his scalp lesion was removed about 10 

days ago.  Patient has been having progressively decline in his status at the 

nursing home.  Patient was admitted an MRI was ordered to rule out stroke.  An 

MRI patient was found to have cardiopulmonary arrest.  Patient is DNR DNI thus 

no resuscitation was done.  Patient was pronounced at 12:10 p.m. with no 

cardiac activity after checking neurological status.  Family was informed and 

patient was brought back to original room for family to view the patient.


Vital Signs/Physical Exam: 














Temp Pulse Resp BP Pulse Ox


 


 99.1 F   67   18   155/72 H  96 


 


 02/04/19 09:20  02/04/19 09:20  02/04/19 09:20  02/04/19 09:20  02/04/19 09:20








General: Demented, Confused


Neck: JVD distended


Respiratory: Normal air movement, Expiratory wheezes, Inspiratory wheezes


Cardiovascular: Normal S1 S2, Irregular heart rate/rhythm


Musculoskeletal: No tenderness


Integumentary: Skin lesion


Neurological: Abnormal speech, Abnormal strength, Abnormal affect, Dementia


Lymphatics: No axilla or inguinal lymphadenopathy


Laboratory Data at Discharge: 














WBC  7.7 K/uL (4.3-10.9)   02/04/19  05:24    


 


Hgb  11.3 g/dL (13.6-17.9)  L  02/04/19  05:24    


 


Hct  32.9 % (39.6-49.0)  L  02/04/19  05:24    


 


Plt Count  168 K/uL (152-406)   02/04/19  05:24    


 


PT  14.0 SECONDS (9.5-12.5)  H  02/03/19  19:25    


 


INR  1.18   02/03/19  19:25    


 


APTT  31.7 SECONDS (24.3-36.9)   02/03/19  19:25    


 


Sodium  144 mmol/L (136-145)   02/04/19  05:24    


 


Potassium  3.7 mmol/L (3.5-5.1)   02/04/19  05:24    


 


BUN  17 mg/dL (7-18)   02/04/19  05:24    


 


Creatinine  0.88 mg/dL (0.55-1.3)   02/04/19  05:24    


 


Glucose  106 mg/dL ()   02/04/19  05:24    


 


Phosphorus  2.4 mg/dL (2.5-4.9)  L  02/04/19  05:24    


 


Magnesium  2.2 mg/dL (1.8-2.4)   02/04/19  05:24    


 


Total Bilirubin  0.8 mg/dL (0.2-1.0)   02/04/19  05:24    


 


AST  11 U/L (15-37)  L  02/04/19  05:24    


 


ALT  16 U/L (12-78)   02/04/19  05:24    


 


Alkaline Phosphatase  79 U/L ()   02/04/19  05:24    


 


Triglycerides  75 mg/dL (<150)   02/04/19  05:24    


 


Cholesterol  122 mg/dL (<200)   02/04/19  05:24    


 


LDL Cholesterol Direct  60 mg/dL (100-129)  L  02/04/19  05:24    


 


HDL Cholesterol  52 mg/dL (40-60)   02/04/19  05:24    


 


Cholesterol/HDL Ratio  2.35   02/04/19  05:24    


 


Lipase  129 U/L ()   02/03/19  19:25    








Home Medications: 








Aspirin 325 mg PO DAILY 02/03/19 


Donepezil [Aricept] 10 mg PO BEDTIME 02/03/19 


Fosinopril Sodium 20 mg PO DAILY 02/03/19 


Memantine HCl [Namenda Xr] 28 mg PO DAILY 02/03/19 


Mupirocin 1 appl TOP DAILY 02/03/19 


Propylene Glycol/Peg 400/Pf [Lubricant Eye 0.4%-0.3% Drops] 1 each OP BID 02/03/ 19 


Sertraline [Zoloft] 25 mg PO DAILY 02/03/19 


Simvastatin 20 mg PO BEDTIME 02/03/19